# Patient Record
Sex: MALE | Race: WHITE | URBAN - METROPOLITAN AREA
[De-identification: names, ages, dates, MRNs, and addresses within clinical notes are randomized per-mention and may not be internally consistent; named-entity substitution may affect disease eponyms.]

---

## 2017-01-17 ENCOUNTER — HOSPITAL ENCOUNTER (EMERGENCY)
Facility: CLINIC | Age: 57
Discharge: HOME | End: 2017-01-17
Attending: EMERGENCY MEDICINE | Admitting: EMERGENCY MEDICINE
Payer: COMMERCIAL

## 2017-01-17 VITALS
SYSTOLIC BLOOD PRESSURE: 158 MMHG | DIASTOLIC BLOOD PRESSURE: 88 MMHG | RESPIRATION RATE: 16 BRPM | HEART RATE: 73 BPM | TEMPERATURE: 98.3 F | OXYGEN SATURATION: 95 %

## 2017-01-17 DIAGNOSIS — K57.32 DIVERTICULITIS OF COLON: ICD-10-CM

## 2017-01-17 PROCEDURE — 99284 EMERGENCY DEPT VISIT MOD MDM: CPT | Mod: Z6 | Performed by: EMERGENCY MEDICINE

## 2017-01-17 PROCEDURE — 25000128 H RX IP 250 OP 636: Performed by: EMERGENCY MEDICINE

## 2017-01-17 PROCEDURE — 25000132 ZZH RX MED GY IP 250 OP 250 PS 637: Performed by: EMERGENCY MEDICINE

## 2017-01-17 PROCEDURE — 99284 EMERGENCY DEPT VISIT MOD MDM: CPT | Mod: 25 | Performed by: EMERGENCY MEDICINE

## 2017-01-17 PROCEDURE — 96372 THER/PROPH/DIAG INJ SC/IM: CPT | Performed by: EMERGENCY MEDICINE

## 2017-01-17 RX ORDER — OXYCODONE HYDROCHLORIDE 5 MG/1
5 TABLET ORAL EVERY 6 HOURS PRN
Qty: 30 TABLET | Refills: 0 | Status: SHIPPED | OUTPATIENT
Start: 2017-01-17 | End: 2017-09-26

## 2017-01-17 RX ORDER — OXYCODONE HYDROCHLORIDE 5 MG/1
5 TABLET ORAL ONCE
Status: COMPLETED | OUTPATIENT
Start: 2017-01-17 | End: 2017-01-17

## 2017-01-17 RX ORDER — OXYCODONE HYDROCHLORIDE 5 MG/1
5 TABLET ORAL EVERY 6 HOURS PRN
Qty: 20 TABLET | Refills: 0 | Status: SHIPPED | OUTPATIENT
Start: 2017-01-17 | End: 2017-01-17

## 2017-01-17 RX ADMIN — OXYCODONE HYDROCHLORIDE 5 MG: 5 TABLET ORAL at 01:45

## 2017-01-17 RX ADMIN — HYDROMORPHONE HYDROCHLORIDE 1 MG: 1 INJECTION, SOLUTION INTRAMUSCULAR; INTRAVENOUS; SUBCUTANEOUS at 02:21

## 2017-01-17 RX ADMIN — AMOXICILLIN AND CLAVULANATE POTASSIUM 1 TABLET: 875; 125 TABLET, FILM COATED ORAL at 01:45

## 2017-01-17 NOTE — ED NOTES
Pt presents with LLQ  Pain that radiates to back x 3 days.   Strained his back about a month ago while doing yoga but thinks it's his diverticulitis.  +Constipation.  Last BM 3 days ago.  Denies n/v/d, fever, hematuria or dysuria.

## 2017-01-17 NOTE — DISCHARGE INSTRUCTIONS
Please make an appointment to follow up with Your Primary Care Provider if not improving.        Diverticulitis    Some people get pouches along the wall of the colon as they get older. The pouches, called diverticuli, usually cause no symptoms. If the pouches become blocked, you can get an infection. This infection is called diverticulitis. It causes pain in your lower abdomen and fever. If not treated, it can become a serious condition, causing an abscess to form inside the pouch. The abscess may block the intestinal tract even or rupture, spreading infection throughout the abdomen.  When treatment is started early, oral antibiotics alone may be enough to cure diverticulitis. This method is tried first. But, if you don't improve or if your condition gets worse while you are trying oral antibiotics, you may need to be admitted to the hospital for IV antibiotics. Severe cases may require surgery.  Home care  The following guidelines will help you care for yourself at home:    During the acute illness, rest and follow a low-fiber diet. Include foods like:    Flake cereal, mashed potatoes, pancakes, waffles, pasta, white bread, rice, applesauce, bananas, eggs, meat, fish, poultry, tofu, and cooked vegetables    Take antibiotics exactly as the doctor says. Don't miss any doses or stop taking the medication, even if you feel better.    Monitor your temperature and report any rising temperatures to your doctor.  Preventing future attacks  Once you have had an episode of diverticulitis, you are at risk for having it again. After you have recovered from this episode, you may be able to reduce your risk by eating a high-fiber diet (20-35 gm/day of fiber). This cleans out the colon pouches that already exist and prevents new ones from forming. Foods high in fiber include fresh fruits and edible peelings, raw or lightly cooked vegetables, whole grain cereals and breads, dried beans and peas, and bran.  Other steps that can  help prevent future attacks include:    Take your medications, such as antibiotics, as the doctor says.    Drink 6 to 8 glasses of water every day, unless directed otherwise.    Use a heating pad or hot water bottle to reduce abdominal cramping or pain.    Begin an exercise program. Ask your doctor how to get started. You can benefit from simple activities such as walking or gardening.    Treat diarrhea with a bland diet. Start with liquids only; then slowly add fiber over time.    Watch for changes in your bowel movements (constipation to diarrhea).    Get plenty of rest and sleep.  Follow-up care  Follow up with your doctor as advised or sooner if you are not getting better in the next 2 days.  When to seek medical care  Get prompt medical attention if any of the following occur:    Fever of 100.4 F (38 C) or higher, or as directed by your health care provider    Repeated vomiting or swelling of the abdomen    Weakness, dizziness, light-headedness    Pain in your abdomen that gets worse, severe, or spreads to your back    Pain that moves to the right lower abdomen    Rectal bleeding (stools that are red, black or maroon color)    Unexpected vaginal bleeding    4629-3449 [employer]. [address] [place] [address]. All rights reserved. This information is not intended as a substitute for professional medical care. Always follow your healthcare professional's instructions.

## 2017-01-17 NOTE — ED AVS SNAPSHOT
[facility], [place], Emergency Department    [address] [address]    Phone:  [phone]                                       Sarbjit Boone   MRN: [id number]    Department:  [facility], Emergency Department   Date of Visit:  1/17/2017           After Visit Summary Signature Page     I have received my discharge instructions, and my questions have been answered. I have discussed any challenges I see with this plan with the nurse or doctor.    ..........................................................................................................................................  Patient/Patient Representative Signature      ..........................................................................................................................................  Patient Representative Print Name and Relationship to Patient    ..................................................               ................................................  Date                                            Time    ..........................................................................................................................................  Reviewed by Signature/Title    ...................................................              ..............................................  Date                                                            Time

## 2017-01-17 NOTE — ED PROVIDER NOTES
History     Chief Complaint   Patient presents with     Abdominal Pain     HPI  Sarbjit Boone is a 56 year old male with a history of hypercholesterolemia, diverticulitis and chronic low back pain who presents with abdominal pain and constipation. The patient reports that he has had pain radiating from the right lower back around into his lower abdomen since Saturday, 3 days ago. The pain has been constant and progressively worsening since onset, especially tonight, prompting his arrival to the ED. He has been taking Tylenol for the pain without relief. The pain improves slightly with laying on his left side in a fetal position. He complains of constipation over the past 2 days. He denies nausea, vomiting, diarrhea or urinary symptoms. The patient reports a history of chronic back pain as a result of a yoga injury, and is unsure if this is contributing. He denies rash. He has no history of shingles. He denies any known medication allergies. He reports a history of diverticulitis in the past which was treated with antibiotics.     Past Medical History   Diagnosis Date     Allergic rhinitis, cause unspecified      Allergic rhinitis     LBP (low back pain)      Melanoma in situ of back (H) 2008     Diverticulosis, sigmoid      Pure hypercholesterolaemia      Impaired fasting glucose      Diverticulitis of sigmoid colon 12/08 ER       Past Surgical History   Procedure Laterality Date     Surgical history of -   10/2008     Excision Melanoma     Hc colonoscopy thru stoma w biopsy/cautery tumor/polyp/lesion  9/08     repeat 3 yr       Family History   Problem Relation Age of Onset     C.A.D. Mother      age72     C.A.D. Father      age 73       Social History   Substance Use Topics     Smoking status: Never Smoker      Smokeless tobacco: Never Used     Alcohol Use: Yes      Comment: wine         No current facility-administered medications for this encounter.     Current Outpatient Prescriptions   Medication      amoxicillin-clavulanate (AUGMENTIN) 875-125 MG per tablet     oxyCODONE (ROXICODONE) 5 MG IR tablet     UNABLE TO FIND     Zileuton (ZYFLO PO)     UNABLE TO FIND     magnesium 250 MG tablet     Coenzyme Q10 (CO Q 10) 10 MG CAPS     FISH OIL     Multiple Vitamins-Minerals (MULTIVITAL PO)     Cholecalciferol (VITAMIN D PO)     Probiotic Product (PROBIOTIC PO)     aspirin 81 MG tablet     simvastatin (ZOCOR) 20 MG tablet     sildenafil (VIAGRA) 50 MG tablet        Allergies   Allergen Reactions     Nkda [No Known Drug Allergies]       I have reviewed the Medications, Allergies, Past Medical and Surgical History, and Social History in the Epic system.    Review of Systems  10 pt ROS completed and negative except as noted above in HPI   Physical Exam   BP: 149/87 mmHg  Pulse: 73  Temp: 98.3  F (36.8  C)  Resp: 16  SpO2: 98 %  Physical Exam   Constitutional: He is oriented to person, place, and time. No distress.   HENT:   Head: Normocephalic and atraumatic.   Mouth/Throat: No oropharyngeal exudate.   Eyes: Conjunctivae are normal. Pupils are equal, round, and reactive to light. No scleral icterus.   Neck: Normal range of motion. Neck supple.   Cardiovascular: Normal rate and intact distal pulses.    Pulmonary/Chest: Effort normal. No respiratory distress. He has no wheezes. He has no rales. He exhibits no tenderness.   Abdominal: Soft. He exhibits no distension. There is tenderness. There is no rebound and no guarding.   Mild lef lower abdominal tenderness   Musculoskeletal: Normal range of motion.   Neurological: He is alert and oriented to person, place, and time.   Skin: Skin is warm and dry. He is not diaphoretic.   Nursing note and vitals reviewed.      ED Course   Procedures     1:04 AM  The patient was seen and examined by Dr. Altman in Room 17.               Critical Care time:  none               Labs Ordered and Resulted from Time of ED Arrival Up to the Time of Departure from the ED - No data to  display    Assessments & Plan (with Medical Decision Making)   1.  Diveriticulitis    57 yo M with a history of Diverticulitis who presents with left lower abdominal pain.  Patient is non-toxic.  His exam shows no evidence of an acute abdomen.  Given his prior history of diverticulitis and clinical presentation, he'll be treated with Diverticulitis with Augmentin.  He felt improved after Dilaudid 1mg IM.  The patient was discharged and told to return if any worsening symptoms.       I have reviewed the nursing notes.    I have reviewed the findings, diagnosis, plan and need for follow up with the patient.    New Prescriptions    AMOXICILLIN-CLAVULANATE (AUGMENTIN) 875-125 MG PER TABLET    Take 1 tablet by mouth 2 times daily for 7 days    OXYCODONE (ROXICODONE) 5 MG IR TABLET    Take 1 tablet (5 mg) by mouth every 6 hours as needed for pain       Final diagnoses:   Diverticulitis of colon     IJoao, am serving as a trained medical scribe to document services personally performed by Chris Altman MD, based on the provider's statements to me.   IChris MD, was physically present and have reviewed and verified the accuracy of this note documented by Joao Scott.     1/17/2017   [facility], [place], EMERGENCY DEPARTMENT      Chris Altman MD  01/17/17 0303

## 2017-01-17 NOTE — ED AVS SNAPSHOT
[facility], Emergency Department    [address] [facility] [address]    Phone:  [phone]                                       Sarbjit Boone   MRN: [id number]    Department:  [facility], Emergency Department   Date of Visit:  1/17/2017           Patient Information     Date Of Birth          1960        Your diagnoses for this visit were:     Diverticulitis of colon        You were seen by Chris Altman MD.        Discharge Instructions       Please make an appointment to follow up with Your Primary Care Provider if not improving.        Diverticulitis    Some people get pouches along the wall of the colon as they get older. The pouches, called diverticuli, usually cause no symptoms. If the pouches become blocked, you can get an infection. This infection is called diverticulitis. It causes pain in your lower abdomen and fever. If not treated, it can become a serious condition, causing an abscess to form inside the pouch. The abscess may block the intestinal tract even or rupture, spreading infection throughout the abdomen.  When treatment is started early, oral antibiotics alone may be enough to cure diverticulitis. This method is tried first. But, if you don't improve or if your condition gets worse while you are trying oral antibiotics, you may need to be admitted to the hospital for IV antibiotics. Severe cases may require surgery.  Home care  The following guidelines will help you care for yourself at home:    During the acute illness, rest and follow a low-fiber diet. Include foods like:    Flake cereal, mashed potatoes, pancakes, waffles, pasta, white bread, rice, applesauce, bananas, eggs, meat, fish, poultry, tofu, and cooked vegetables    Take antibiotics exactly as the doctor says. Don't miss any doses or stop taking the medication, even if you feel better.    Monitor your temperature and report any rising temperatures to your doctor.  Preventing future attacks  Once you  have had an episode of diverticulitis, you are at risk for having it again. After you have recovered from this episode, you may be able to reduce your risk by eating a high-fiber diet (20-35 gm/day of fiber). This cleans out the colon pouches that already exist and prevents new ones from forming. Foods high in fiber include fresh fruits and edible peelings, raw or lightly cooked vegetables, whole grain cereals and breads, dried beans and peas, and bran.  Other steps that can help prevent future attacks include:    Take your medications, such as antibiotics, as the doctor says.    Drink 6 to 8 glasses of water every day, unless directed otherwise.    Use a heating pad or hot water bottle to reduce abdominal cramping or pain.    Begin an exercise program. Ask your doctor how to get started. You can benefit from simple activities such as walking or gardening.    Treat diarrhea with a bland diet. Start with liquids only; then slowly add fiber over time.    Watch for changes in your bowel movements (constipation to diarrhea).    Get plenty of rest and sleep.  Follow-up care  Follow up with your doctor as advised or sooner if you are not getting better in the next 2 days.  When to seek medical care  Get prompt medical attention if any of the following occur:    Fever of 100.4 F (38 C) or higher, or as directed by your health care provider    Repeated vomiting or swelling of the abdomen    Weakness, dizziness, light-headedness    Pain in your abdomen that gets worse, severe, or spreads to your back    Pain that moves to the right lower abdomen    Rectal bleeding (stools that are red, black or maroon color)    Unexpected vaginal bleeding    9087-0505 [employer]. [address] [place] [address], [place] [address]. All rights reserved. This information is not intended as a substitute for professional medical care. Always follow your healthcare professional's instructions.          Future Appointments        Provider  Department Dept Phone Center    3/22/2050 8:00 AM [facility] ULTRASOUND ROOM 1 [facility], [place], Ultrasound [phone] [place]      24 Hour Appointment Hotline       To make an appointment at any [place] clinic, call [phone] ([phone]). If you don't have a family doctor or clinic, we will help you find one. [place] clinics are conveniently located to serve the needs of you and your family.             Review of your medicines      START taking        Dose / Directions Last dose taken    amoxicillin-clavulanate 875-125 MG per tablet   Commonly known as:  AUGMENTIN   Dose:  1 tablet   Quantity:  14 tablet        Take 1 tablet by mouth 2 times daily for 7 days   Refills:  0        oxyCODONE 5 MG IR tablet   Commonly known as:  ROXICODONE   Dose:  5 mg   Quantity:  20 tablet        Take 1 tablet (5 mg) by mouth every 6 hours as needed for pain   Refills:  0          Our records show that you are taking the medicines listed below. If these are incorrect, please call your family doctor or clinic.        Dose / Directions Last dose taken    aspirin 81 MG tablet   Dose:  1 tablet        Take 1 tablet by mouth daily.   Refills:  3        Co Q 10 10 MG Caps   Quantity:  30 capsule        Refills:  0        FISH OIL   Dose:  1 capsule        1 capsule. 500mg   Refills:  0        magnesium 250 MG tablet   Dose:  1 tablet   Quantity:  30 tablet        Take 1 tablet by mouth daily   Refills:  0        MULTIVITAL PO        Take  by mouth.   Refills:  0        PROBIOTIC PO        Take  by mouth.   Refills:  0        sildenafil 50 MG cap/tab   Commonly known as:  REVATIO/VIAGRA   Dose:  25-50 mg   Quantity:  12 tablet        Take 0.5-1 tablets (25-50 mg) by mouth daily as needed for erectile dysfunction Take 30 min to 4 hours before intercourse.  Never use with nitroglycerin, terazosin or doxazosin.   Refills:  11        simvastatin 20 MG tablet   Commonly known as:  ZOCOR   Dose:  20 mg   Quantity:  90  tablet        Take 1 tablet (20 mg) by mouth At Bedtime   Refills:  3        * UNABLE TO FIND   Dose:  1 tablet        Take 1 tablet by mouth daily MEDICATION NAME: Tumeric   Refills:  0        * UNABLE TO FIND   Dose:  2 tablet        Take 2 tablets by mouth daily MEDICATION NAME: Zyflomend   Refills:  0        VITAMIN D PO   Dose:  4000 Units        Take 4,000 Units by mouth daily   Refills:  0        ZYFLO PO        Refills:  0        * Notice:  This list has 2 medication(s) that are the same as other medications prescribed for you. Read the directions carefully, and ask your doctor or other care provider to review them with you.            Prescriptions were sent or printed at these locations (2 Prescriptions)                   Other Prescriptions                Printed at Department/Unit printer (2 of 2)         amoxicillin-clavulanate (AUGMENTIN) 875-125 MG per tablet               oxyCODONE (ROXICODONE) 5 MG IR tablet                Orders Needing Specimen Collection     None      Pending Results     No orders found from 1/16/2017 to 1/18/2017.            Pending Culture Results     No orders found from 1/16/2017 to 1/18/2017.            Thank you for choosing [facility]       Thank you for choosing [facility] for your care. Our goal is always to provide you with excellent care. Hearing back from our patients is one way we can continue to improve our services. Please take a few minutes to complete the written survey that you may receive in the mail after you visit with us. Thank you!        [facility] Information     [employer] gives you secure access to your electronic health record. If you see a primary care provider, you can also send messages to your care team and make appointments. If you have questions, please call your primary care clinic.  If you do not have a primary care provider, please call [phone] and they will assist you.        Care EveryWhere ID     This is your Care EveryWhere ID. This could be  used by other organizations to access your [facility] medical records  [id number]        After Visit Summary       This is your record. Keep this with you and show to your community pharmacist(s) and doctor(s) at your next visit.

## 2017-01-22 ENCOUNTER — OFFICE VISIT (OUTPATIENT)
Dept: URGENT CARE | Facility: URGENT CARE | Age: 57
End: 2017-01-22

## 2017-01-22 ENCOUNTER — HOSPITAL ENCOUNTER (EMERGENCY)
Facility: CLINIC | Age: 57
Discharge: HOME | End: 2017-01-22
Attending: EMERGENCY MEDICINE | Admitting: EMERGENCY MEDICINE
Payer: COMMERCIAL

## 2017-01-22 ENCOUNTER — APPOINTMENT (OUTPATIENT)
Dept: CT IMAGING | Facility: CLINIC | Age: 57
End: 2017-01-22
Attending: EMERGENCY MEDICINE
Payer: COMMERCIAL

## 2017-01-22 VITALS
DIASTOLIC BLOOD PRESSURE: 85 MMHG | TEMPERATURE: 98.9 F | OXYGEN SATURATION: 97 % | HEART RATE: 61 BPM | WEIGHT: 194 LBS | SYSTOLIC BLOOD PRESSURE: 146 MMHG | RESPIRATION RATE: 18 BRPM | HEIGHT: 72 IN | BODY MASS INDEX: 26.28 KG/M2

## 2017-01-22 VITALS — TEMPERATURE: 98.1 F | HEART RATE: 89 BPM | OXYGEN SATURATION: 98 %

## 2017-01-22 DIAGNOSIS — Z53.9 DIAGNOSIS NOT YET DEFINED: Primary | ICD-10-CM

## 2017-01-22 DIAGNOSIS — M54.5 ACUTE LEFT-SIDED LOW BACK PAIN, WITH SCIATICA PRESENCE UNSPECIFIED: ICD-10-CM

## 2017-01-22 LAB
ALBUMIN SERPL-MCNC: 3.8 G/DL (ref 3.4–5)
ALBUMIN UR-MCNC: NEGATIVE MG/DL
ALP SERPL-CCNC: 49 U/L (ref 40–150)
ALT SERPL W P-5'-P-CCNC: 28 U/L (ref 0–70)
AMORPH CRY #/AREA URNS HPF: ABNORMAL /HPF
ANION GAP SERPL CALCULATED.3IONS-SCNC: 6 MMOL/L (ref 3–14)
APPEARANCE UR: CLEAR
AST SERPL W P-5'-P-CCNC: 18 U/L (ref 0–45)
BASOPHILS # BLD AUTO: 0 10E9/L (ref 0–0.2)
BASOPHILS NFR BLD AUTO: 0.2 %
BILIRUB SERPL-MCNC: 0.7 MG/DL (ref 0.2–1.3)
BILIRUB UR QL STRIP: NEGATIVE
BUN SERPL-MCNC: 13 MG/DL (ref 7–30)
CALCIUM SERPL-MCNC: 9.7 MG/DL (ref 8.5–10.1)
CHLORIDE SERPL-SCNC: 109 MMOL/L (ref 94–109)
CO2 SERPL-SCNC: 28 MMOL/L (ref 20–32)
COLOR UR AUTO: ABNORMAL
CREAT SERPL-MCNC: 0.87 MG/DL (ref 0.66–1.25)
DIFFERENTIAL METHOD BLD: NORMAL
EOSINOPHIL # BLD AUTO: 0 10E9/L (ref 0–0.7)
EOSINOPHIL NFR BLD AUTO: 0.7 %
ERYTHROCYTE [DISTWIDTH] IN BLOOD BY AUTOMATED COUNT: 12.5 % (ref 10–15)
GFR SERPL CREATININE-BSD FRML MDRD: ABNORMAL ML/MIN/1.7M2
GLUCOSE SERPL-MCNC: 109 MG/DL (ref 70–99)
GLUCOSE UR STRIP-MCNC: NEGATIVE MG/DL
HCT VFR BLD AUTO: 43.1 % (ref 40–53)
HGB BLD-MCNC: 15.1 G/DL (ref 13.3–17.7)
HGB UR QL STRIP: NEGATIVE
IMM GRANULOCYTES # BLD: 0 10E9/L (ref 0–0.4)
IMM GRANULOCYTES NFR BLD: 0 %
KETONES UR STRIP-MCNC: NEGATIVE MG/DL
LEUKOCYTE ESTERASE UR QL STRIP: NEGATIVE
LYMPHOCYTES # BLD AUTO: 1.2 10E9/L (ref 0.8–5.3)
LYMPHOCYTES NFR BLD AUTO: 30.7 %
MCH RBC QN AUTO: 30.6 PG (ref 26.5–33)
MCHC RBC AUTO-ENTMCNC: 35 G/DL (ref 31.5–36.5)
MCV RBC AUTO: 87 FL (ref 78–100)
MONOCYTES # BLD AUTO: 0.5 10E9/L (ref 0–1.3)
MONOCYTES NFR BLD AUTO: 13 %
MUCOUS THREADS #/AREA URNS LPF: PRESENT /LPF
NEUTROPHILS # BLD AUTO: 2.2 10E9/L (ref 1.6–8.3)
NEUTROPHILS NFR BLD AUTO: 55.4 %
NITRATE UR QL: NEGATIVE
NRBC # BLD AUTO: 0 10*3/UL
NRBC BLD AUTO-RTO: 0 /100
PH UR STRIP: 6.5 PH (ref 5–7)
PLATELET # BLD AUTO: 215 10E9/L (ref 150–450)
POTASSIUM SERPL-SCNC: 3.8 MMOL/L (ref 3.4–5.3)
PROT SERPL-MCNC: 7.2 G/DL (ref 6.8–8.8)
RBC # BLD AUTO: 4.93 10E12/L (ref 4.4–5.9)
RBC #/AREA URNS AUTO: 1 /HPF (ref 0–2)
SODIUM SERPL-SCNC: 143 MMOL/L (ref 133–144)
SP GR UR STRIP: 1.01 (ref 1–1.03)
URN SPEC COLLECT METH UR: ABNORMAL
UROBILINOGEN UR STRIP-MCNC: NORMAL MG/DL (ref 0–2)
WBC # BLD AUTO: 4 10E9/L (ref 4–11)
WBC #/AREA URNS AUTO: 1 /HPF (ref 0–2)

## 2017-01-22 PROCEDURE — 74177 CT ABD & PELVIS W/CONTRAST: CPT

## 2017-01-22 PROCEDURE — 25000125 ZZHC RX 250: Performed by: EMERGENCY MEDICINE

## 2017-01-22 PROCEDURE — 96374 THER/PROPH/DIAG INJ IV PUSH: CPT

## 2017-01-22 PROCEDURE — 96361 HYDRATE IV INFUSION ADD-ON: CPT | Mod: 59

## 2017-01-22 PROCEDURE — 81001 URINALYSIS AUTO W/SCOPE: CPT | Performed by: EMERGENCY MEDICINE

## 2017-01-22 PROCEDURE — 96375 TX/PRO/DX INJ NEW DRUG ADDON: CPT

## 2017-01-22 PROCEDURE — 99285 EMERGENCY DEPT VISIT HI MDM: CPT | Mod: 25

## 2017-01-22 PROCEDURE — 99285 EMERGENCY DEPT VISIT HI MDM: CPT | Mod: Z6 | Performed by: EMERGENCY MEDICINE

## 2017-01-22 PROCEDURE — 85025 COMPLETE CBC W/AUTO DIFF WBC: CPT | Performed by: EMERGENCY MEDICINE

## 2017-01-22 PROCEDURE — 25000128 H RX IP 250 OP 636: Performed by: EMERGENCY MEDICINE

## 2017-01-22 PROCEDURE — 25800025 ZZH RX 258: Performed by: EMERGENCY MEDICINE

## 2017-01-22 PROCEDURE — 25000132 ZZH RX MED GY IP 250 OP 250 PS 637: Performed by: EMERGENCY MEDICINE

## 2017-01-22 PROCEDURE — 80053 COMPREHEN METABOLIC PANEL: CPT | Performed by: EMERGENCY MEDICINE

## 2017-01-22 PROCEDURE — 25500064 ZZH RX 255 OP 636: Performed by: EMERGENCY MEDICINE

## 2017-01-22 RX ORDER — ONDANSETRON 2 MG/ML
8 INJECTION INTRAMUSCULAR; INTRAVENOUS ONCE
Status: COMPLETED | OUTPATIENT
Start: 2017-01-22 | End: 2017-01-22

## 2017-01-22 RX ORDER — METHYLPREDNISOLONE 4 MG
TABLET, DOSE PACK ORAL
Qty: 21 TABLET | Refills: 0 | Status: SHIPPED | OUTPATIENT
Start: 2017-01-22 | End: 2017-09-26

## 2017-01-22 RX ORDER — LIDOCAINE 50 MG/G
1 PATCH TOPICAL ONCE
Status: COMPLETED | OUTPATIENT
Start: 2017-01-22 | End: 2017-01-22

## 2017-01-22 RX ORDER — KETOROLAC TROMETHAMINE 30 MG/ML
30 INJECTION, SOLUTION INTRAMUSCULAR; INTRAVENOUS ONCE
Status: COMPLETED | OUTPATIENT
Start: 2017-01-22 | End: 2017-01-22

## 2017-01-22 RX ORDER — IOPAMIDOL 755 MG/ML
100 INJECTION, SOLUTION INTRAVASCULAR ONCE
Status: COMPLETED | OUTPATIENT
Start: 2017-01-22 | End: 2017-01-22

## 2017-01-22 RX ORDER — GABAPENTIN 300 MG/1
300 CAPSULE ORAL 3 TIMES DAILY
Qty: 90 CAPSULE | Refills: 1 | Status: SHIPPED | OUTPATIENT
Start: 2017-01-22

## 2017-01-22 RX ORDER — LIDOCAINE 50 MG/G
PATCH TOPICAL
Qty: 10 PATCH | Refills: 0 | Status: SHIPPED | OUTPATIENT
Start: 2017-01-22 | End: 2017-09-26

## 2017-01-22 RX ADMIN — SODIUM CHLORIDE 55 ML: 9 INJECTION, SOLUTION INTRAVENOUS at 21:31

## 2017-01-22 RX ADMIN — ONDANSETRON 8 MG: 2 INJECTION INTRAMUSCULAR; INTRAVENOUS at 21:13

## 2017-01-22 RX ADMIN — HYDROMORPHONE HYDROCHLORIDE 1 MG: 1 INJECTION, SOLUTION INTRAMUSCULAR; INTRAVENOUS; SUBCUTANEOUS at 19:02

## 2017-01-22 RX ADMIN — KETOROLAC TROMETHAMINE 30 MG: 30 INJECTION, SOLUTION INTRAMUSCULAR at 21:10

## 2017-01-22 RX ADMIN — SODIUM CHLORIDE, POTASSIUM CHLORIDE, SODIUM LACTATE AND CALCIUM CHLORIDE 1000 ML: 600; 310; 30; 20 INJECTION, SOLUTION INTRAVENOUS at 18:46

## 2017-01-22 RX ADMIN — LIDOCAINE 1 PATCH: 50 PATCH CUTANEOUS at 22:23

## 2017-01-22 RX ADMIN — IOPAMIDOL 95 ML: 755 INJECTION, SOLUTION INTRAVENOUS at 21:30

## 2017-01-22 ASSESSMENT — ENCOUNTER SYMPTOMS
HEMATURIA: 0
DYSURIA: 0
CONSTIPATION: 1
NAUSEA: 1
VOMITING: 0
BACK PAIN: 1

## 2017-01-22 NOTE — NURSING NOTE
"Chief Complaint   Patient presents with     Urgent Care     Abdominal Pain     llq abominal pain, possible sciatic pain on left side. Seen 1 week ago for probable diverticulitis. Pt is having nausea  and continued pain.        Initial Pulse 89  Temp(Src) 98.1  F (36.7  C) (Oral)  SpO2 98% Estimated body mass index is 26.49 kg/(m^2) as calculated from the following:    Height as of 3/25/16: 5' 11.5\" (1.816 m).    Weight as of 3/25/16: 192 lb 9.6 oz (87.363 kg).  BP completed using cuff size: regular  "

## 2017-01-22 NOTE — ED AVS SNAPSHOT
[facility], [place], Emergency Department    [address]    [facility] [address]    Phone:  [phone]    Fax:  [phone]                                       Sarbjit Boone   MRN: [id number]    Department:  [facility], Emergency Department   Date of Visit:  1/22/2017           After Visit Summary Signature Page     I have received my discharge instructions, and my questions have been answered. I have discussed any challenges I see with this plan with the nurse or doctor.    ..........................................................................................................................................  Patient/Patient Representative Signature      ..........................................................................................................................................  Patient Representative Print Name and Relationship to Patient    ..................................................               ................................................  Date                                            Time    ..........................................................................................................................................  Reviewed by Signature/Title    ...................................................              ..............................................  Date                                                            Time

## 2017-01-22 NOTE — ED AVS SNAPSHOT
[facility], Emergency Department    [address] [place] [address]    Phone:  [phone]    Fax:  [phone]                                       Sarbjit Boone   MRN: [id number]    Department:  [facility], Emergency Department   Date of Visit:  1/22/2017           Patient Information     Date Of Birth          1960        Your diagnoses for this visit were:     Acute left-sided low back pain, with sciatica presence unspecified        You were seen by William Elliott MD.        Discharge Instructions       Please make an appointment to follow up with Your Primary Care Provider as soon as possible for reevaluation and referral for MRI.    Neurontin and Medrol Dosepak as directed.    Lidoderm patch as directed, fill this if the patch that we applied seemed to help.    Return to the emergency Department for any problems.      Future Appointments        Provider Department Dept Phone Center    3/22/2050 8:00 AM [facility] ULTRASOUND ROOM 1 [facility], Ultrasound [phone] [employer]      24 Hour Appointment Hotline       To make an appointment at any [place] clinic, call [phone] ([phone]). If you don't have a family doctor or clinic, we will help you find one. [place] clinics are conveniently located to serve the needs of you and your family.             Review of your medicines      START taking        Dose / Directions Last dose taken    gabapentin 300 MG capsule   Commonly known as:  NEURONTIN   Dose:  300 mg   Quantity:  90 capsule        Take 1 capsule (300 mg) by mouth 3 times daily Start by taking 1 pill the 1st day, then 1 pill twice a day on the 2nd day and then begin taking one pill 3 times a day   Refills:  1        lidocaine 5 % Patch   Commonly known as:  LIDODERM   Quantity:  10 patch        Apply patch to affected area and leave on for 12 hours.  Remove patch for 12 hours before applying next patch.   Refills:  0         methylPREDNISolone 4 MG tablet   Commonly known as:  MEDROL DOSEPAK   Quantity:  21 tablet        Follow package instructions   Refills:  0          Our records show that you are taking the medicines listed below. If these are incorrect, please call your family doctor or clinic.        Dose / Directions Last dose taken    amoxicillin-clavulanate 875-125 MG per tablet   Commonly known as:  AUGMENTIN   Dose:  1 tablet   Quantity:  14 tablet        Take 1 tablet by mouth 2 times daily for 7 days   Refills:  0        aspirin 81 MG tablet   Dose:  1 tablet        Take 1 tablet by mouth daily.   Refills:  3        Co Q 10 10 MG Caps   Quantity:  30 capsule        Refills:  0        FISH OIL   Dose:  1 capsule        1 capsule. 500mg   Refills:  0        magnesium 250 MG tablet   Dose:  1 tablet   Quantity:  30 tablet        Take 1 tablet by mouth daily   Refills:  0        MULTIVITAL PO        Take  by mouth.   Refills:  0        oxyCODONE 5 MG IR tablet   Commonly known as:  ROXICODONE   Dose:  5 mg   Quantity:  30 tablet        Take 1 tablet (5 mg) by mouth every 6 hours as needed for pain   Refills:  0        PROBIOTIC PO        Take  by mouth.   Refills:  0        sildenafil 50 MG cap/tab   Commonly known as:  REVATIO/VIAGRA   Dose:  25-50 mg   Quantity:  12 tablet        Take 0.5-1 tablets (25-50 mg) by mouth daily as needed for erectile dysfunction Take 30 min to 4 hours before intercourse.  Never use with nitroglycerin, terazosin or doxazosin.   Refills:  11        simvastatin 20 MG tablet   Commonly known as:  ZOCOR   Dose:  20 mg   Quantity:  90 tablet        Take 1 tablet (20 mg) by mouth At Bedtime   Refills:  3        * UNABLE TO FIND   Dose:  1 tablet        Take 1 tablet by mouth daily MEDICATION NAME: Tumeric   Refills:  0        * UNABLE TO FIND   Dose:  2 tablet        Take 2 tablets by mouth daily MEDICATION NAME: Zyflomend   Refills:  0        VITAMIN D PO   Dose:  4000 Units        Take 4,000 Units by  mouth daily   Refills:  0        ZYFLO PO        Refills:  0        * Notice:  This list has 2 medication(s) that are the same as other medications prescribed for you. Read the directions carefully, and ask your doctor or other care provider to review them with you.            Prescriptions were sent or printed at these locations (3 Prescriptions)                   Other Prescriptions                Printed at Department/Unit printer (3 of 3)         gabapentin (NEURONTIN) 300 MG capsule               lidocaine (LIDODERM) 5 % Patch               methylPREDNISolone (MEDROL DOSEPAK) 4 MG tablet                Procedures and tests performed during your visit     CBC with platelets differential    CT Abdomen Pelvis w Contrast    Comprehensive metabolic panel    UA with Microscopic reflex to Culture      Orders Needing Specimen Collection     None      Pending Results     No orders found from 1/21/2017 to 1/23/2017.            Pending Culture Results     No orders found from 1/21/2017 to 1/23/2017.            Thank you for choosing [facility]       Thank you for choosing [facility] for your care. Our goal is always to provide you with excellent care. Hearing back from our patients is one way we can continue to improve our services. Please take a few minutes to complete the written survey that you may receive in the mail after you visit with us. Thank you!        [employer] Information     [employer] gives you secure access to your electronic health record. If you see a primary care provider, you can also send messages to your care team and make appointments. If you have questions, please call your primary care clinic.  If you do not have a primary care provider, please call [phone] and they will assist you.        Care EveryWhere ID     This is your Care EveryWhere ID. This could be used by other organizations to access your [facility] medical records  [id number]        After Visit Summary       This is your record. Keep this  with you and show to your community pharmacist(s) and doctor(s) at your next visit.

## 2017-01-23 ENCOUNTER — TELEPHONE (OUTPATIENT)
Dept: FAMILY MEDICINE | Facility: CLINIC | Age: 57
End: 2017-01-23

## 2017-01-23 DIAGNOSIS — M54.50 ACUTE BILATERAL LOW BACK PAIN WITHOUT SCIATICA: Primary | ICD-10-CM

## 2017-01-23 NOTE — ED PROVIDER NOTES
History     Chief Complaint   Patient presents with     Abdominal Pain     Abdominal pain and diverticulitis. Was seen in [place] ED last week. Antibiotics and pain medicine, but no improvement. Left buttock, groin, lower back pain increasing and effecting sleep.      Nausea     Started this morning.      HPI  Sarbjit Boone is a 56 year old male who presents to the Emergency Department with left buttock pain and constipation. Patient states that 6 days ago he had abdominal pain and constipation that felt similar to a previous episode of diverticulitis. He reports that he was given Augmentin and oxycodone after this visit. Patient states that 3 days after this visit he was able to have a bowel movement. He reports that as of late he has had a lot of gas and difficulty passing stool. He also reports that today he has had nausea. Patient states that he has had pain in his left buttock that radiates into his back. it intermittently radiates into his left groin. He states that the pain has prevented him from sleeping throughout the week. He describes this pain as feeling like he was punched in the buttock. The patient has been treating this with a heat pad and 15 mg of oxycodone with no improvement in his symptoms.      He was seen in Urgent Care today and was referred to the ER here at [place]. The pain is constant and he states it is improved with 20 mg of oxycodone and heating pad. He states that while walking yesterday he didn't feel pain but after standing still for 2 minutes the pain returns.     No dysuria, testicular pain, or hematuria. No rashes. He denies any episodes of emesis and states that his nausea started this morning after taking oxycodone. Patient reports a recent back injury from yoga and states that he has had left sided back problems since then. He states that he has never had back pain that radiates into his legs.     His spouse states that for the past 1.5 years he has been having more  frequent low back problems.     I have reviewed the Medications, Allergies, Past Medical and Surgical History, and Social History in the [facility] system.    PAST MEDICAL HISTORY  Past Medical History   Diagnosis Date     Allergic rhinitis, cause unspecified      Allergic rhinitis     LBP (low back pain)      Melanoma in situ of back (H) 2008     Diverticulosis, sigmoid      Pure hypercholesterolaemia      Impaired fasting glucose      Diverticulitis of sigmoid colon 12/08 ER     PAST SURGICAL HISTORY  Past Surgical History   Procedure Laterality Date     Surgical history of -   10/2008     Excision Melanoma     Hc colonoscopy thru stoma w biopsy/cautery tumor/polyp/lesion  9/08     repeat 3 yr     FAMILY HISTORY  Family History   Problem Relation Age of Onset     C.A.D. Mother      age72     C.A.D. Father      age 73     SOCIAL HISTORY  Social History   Substance Use Topics     Smoking status: Never Smoker      Smokeless tobacco: Never Used     Alcohol Use: Yes      Comment: wine         MEDICATIONS  Current Facility-Administered Medications   Medication     [START ON 1/23/2017] lidocaine (LIDODERM) patch REMOVAL     lidocaine (LIDODERM) Patch in Place     Current Outpatient Prescriptions   Medication     gabapentin (NEURONTIN) 300 MG capsule     lidocaine (LIDODERM) 5 % Patch     methylPREDNISolone (MEDROL DOSEPAK) 4 MG tablet     amoxicillin-clavulanate (AUGMENTIN) 875-125 MG per tablet     oxyCODONE (ROXICODONE) 5 MG IR tablet     UNABLE TO FIND     magnesium 250 MG tablet     Cholecalciferol (VITAMIN D PO)     Probiotic Product (PROBIOTIC PO)     simvastatin (ZOCOR) 20 MG tablet     Zileuton (ZYFLO PO)     UNABLE TO FIND     Coenzyme Q10 (CO Q 10) 10 MG CAPS     sildenafil (VIAGRA) 50 MG tablet     FISH OIL     Multiple Vitamins-Minerals (MULTIVITAL PO)     aspirin 81 MG tablet     ALLERGIES  Allergies   Allergen Reactions     Nkda [No Known Drug Allergies]       Review of Systems   Gastrointestinal: Positive for  nausea and constipation. Negative for vomiting.   Genitourinary: Negative for dysuria, hematuria and testicular pain.   Musculoskeletal: Positive for back pain.   Skin: Negative for rash.   All other systems reviewed and are negative.      Physical Exam   BP: (!) 169/92 mmHg  Heart Rate: 78  Temp: 98  F (36.7  C)  Resp: 16  Height: 182.9 cm (6')  Weight: 87.998 kg (194 lb)  SpO2: 97 %  Physical Exam   Constitutional: He is oriented to person, place, and time. Vital signs are normal. He appears well-developed and well-nourished.  Non-toxic appearance. He does not appear ill. No distress.   HENT:   Head: Normocephalic and atraumatic.   Mouth/Throat: Oropharynx is clear and moist. No oropharyngeal exudate.   Eyes: Conjunctivae and EOM are normal. Pupils are equal, round, and reactive to light. No scleral icterus.   Neck: Normal range of motion. Neck supple. No JVD present. No tracheal deviation present. No thyromegaly present.   Cardiovascular: Normal rate, regular rhythm, normal heart sounds and intact distal pulses.  Exam reveals no gallop and no friction rub.    No murmur heard.  Pulmonary/Chest: Effort normal and breath sounds normal. No respiratory distress.   Abdominal: Soft. Bowel sounds are normal. He exhibits no distension and no mass. There is no tenderness. There is no rigidity, no rebound, no guarding and no CVA tenderness. No hernia. Hernia confirmed negative in the right inguinal area and confirmed negative in the left inguinal area.   Genitourinary: Right testis shows no swelling. Left testis shows no swelling.   Musculoskeletal: Normal range of motion. He exhibits no edema or tenderness.   Lymphadenopathy:     He has no cervical adenopathy.        Right: No inguinal adenopathy present.        Left: No inguinal adenopathy present.   Neurological: He is alert and oriented to person, place, and time. He has normal strength. No cranial nerve deficit or sensory deficit.   Skin: Skin is warm and dry. No rash  noted. No erythema. No pallor.   Psychiatric: He has a normal mood and affect. His behavior is normal.   Nursing note and vitals reviewed.      ED Course     Procedures        Results for orders placed or performed during the hospital encounter of 01/22/17   CT Abdomen Pelvis w Contrast    Narrative    CT ABDOMEN PELVIS WITH CONTRAST   1/22/2017 9:34 PM     HISTORY: Left lower quadrant abdominal pain, history of  diverticulitis, evaluate for perforation, abscess or other process.    TECHNIQUE:  CT abdomen and pelvis with Isovue 370, 95 mL IV. Radiation  dose for this scan was reduced using automated exposure control,  adjustment of the mA and/or kV according to patient size, or iterative  reconstruction technique.    COMPARISON: CT abdomen and pelvis 1/5/2009.    FINDINGS: A few scattered colonic diverticula noted distally. However,  no acute diverticulitis is identified. Normal appendix. No bowel  obstruction or inflammation noted. The liver, gallbladder, adrenals,  spleen, pancreas, and kidneys show no acute findings. Vascular  calcifications. No fluid collections or free air.      Impression    IMPRESSION: No acute abnormality is identified.     WILFRID TELLEZ MD   CBC with platelets differential   Result Value Ref Range    WBC 4.0 4.0 - 11.0 10e9/L    RBC Count 4.93 4.4 - 5.9 10e12/L    Hemoglobin 15.1 13.3 - 17.7 g/dL    Hematocrit 43.1 40.0 - 53.0 %    MCV 87 78 - 100 fl    MCH 30.6 26.5 - 33.0 pg    MCHC 35.0 31.5 - 36.5 g/dL    RDW 12.5 10.0 - 15.0 %    Platelet Count 215 150 - 450 10e9/L    Diff Method Automated Method     % Neutrophils 55.4 %    % Lymphocytes 30.7 %    % Monocytes 13.0 %    % Eosinophils 0.7 %    % Basophils 0.2 %    % Immature Granulocytes 0.0 %    Nucleated RBCs 0 0 /100    Absolute Neutrophil 2.2 1.6 - 8.3 10e9/L    Absolute Lymphocytes 1.2 0.8 - 5.3 10e9/L    Absolute Monocytes 0.5 0.0 - 1.3 10e9/L    Absolute Eosinophils 0.0 0.0 - 0.7 10e9/L    Absolute Basophils 0.0 0.0 - 0.2 10e9/L     Abs Immature Granulocytes 0.0 0 - 0.4 10e9/L    Absolute Nucleated RBC 0.0    Comprehensive metabolic panel   Result Value Ref Range    Sodium 143 133 - 144 mmol/L    Potassium 3.8 3.4 - 5.3 mmol/L    Chloride 109 94 - 109 mmol/L    Carbon Dioxide 28 20 - 32 mmol/L    Anion Gap 6 3 - 14 mmol/L    Glucose 109 (H) 70 - 99 mg/dL    Urea Nitrogen 13 7 - 30 mg/dL    Creatinine 0.87 0.66 - 1.25 mg/dL    GFR Estimate >90  Non  GFR Calc   >60 mL/min/1.7m2    GFR Estimate If Black >90   GFR Calc   >60 mL/min/1.7m2    Calcium 9.7 8.5 - 10.1 mg/dL    Bilirubin Total 0.7 0.2 - 1.3 mg/dL    Albumin 3.8 3.4 - 5.0 g/dL    Protein Total 7.2 6.8 - 8.8 g/dL    Alkaline Phosphatase 49 40 - 150 U/L    ALT 28 0 - 70 U/L    AST 18 0 - 45 U/L   UA with Microscopic reflex to Culture   Result Value Ref Range    Color Urine Light Yellow     Appearance Urine Clear     Glucose Urine Negative NEG mg/dL    Bilirubin Urine Negative NEG    Ketones Urine Negative NEG mg/dL    Specific Gravity Urine 1.011 1.003 - 1.035    Blood Urine Negative NEG    pH Urine 6.5 5.0 - 7.0 pH    Protein Albumin Urine Negative NEG mg/dL    Urobilinogen mg/dL Normal 0.0 - 2.0 mg/dL    Nitrite Urine Negative NEG    Leukocyte Esterase Urine Negative NEG    Source Clean catch urine     WBC Urine 1 0 - 2 /HPF    RBC Urine 1 0 - 2 /HPF    Mucous Urine Present (A) NEG /LPF    Amorphous Crystals Few (A) NEG /HPF         Assessments & Plan (with Medical Decision Making)   This patient presented to the emergency department complaining of left-sided back pain that at times would radiate around to the groin and abdomen.  He recently had been treated for suspected diverticulitis.  On exam he has no abdominal tenderness or palpable pulsatile mass.  He is neurovascularly intact in his lower extremities and has no rash to suggest a zoster.  He has no scrotal pain or swelling.  Urine is not suggestive of urolithiasis or urinary tract infection.  He is  afebrile and has no leukocytosis and a CT scan that demonstrated no evidence of abscess, diverticulitis, perforation, or other acute intra-abdominal or retroperitoneal process.  At this point in time my suspicion is that this is neuropathic pain perhaps from spinal root impingement and is more of a manifestation of spinal issues. Nothing to suggest cauda equina, this is why I feel that further workup can occur as an outpatient and have recommended MRI.  He was treated in the emergency department with Toradol, Zofran, and Dilaudid with minimal response to his pain.  Lidoderm patch was applied and patient will be discharged with a Medrol Dosepak and Neurontin and instructions to follow-up with his clinic.  He was discharged with his wife in good condition.      I have reviewed the nursing notes.    I have reviewed the findings, diagnosis, plan and need for follow up with the patient.    Discharge Medication List as of 1/22/2017 10:57 PM      START taking these medications    Details   gabapentin (NEURONTIN) 300 MG capsule Take 1 capsule (300 mg) by mouth 3 times daily Start by taking 1 pill the 1st day, then 1 pill twice a day on the 2nd day and then begin taking one pill 3 times a day, Disp-90 capsule, R-1, Local Print      lidocaine (LIDODERM) 5 % Patch Apply patch to affected area and leave on for 12 hours.  Remove patch for 12 hours before applying next patch.Disp-10 patch, R-0Local Print      methylPREDNISolone (MEDROL DOSEPAK) 4 MG tablet Follow package instructions, Disp-21 tablet, R-0, Local Print             Final diagnoses:   Acute left-sided low back pain, with sciatica presence unspecified     I, Gustabo Ceja, am serving as a trained medical scribe to document services personally performed by William Elliott MD, based on the provider's statements to me.      IWilliam MD, was physically present and have reviewed and verified the accuracy of this note documented by Gustabo Ceja.     1/22/2017   [facility], [place], EMERGENCY DEPARTMENT      William Elliott MD  01/23/17 0005

## 2017-01-23 NOTE — TELEPHONE ENCOUNTER
Routing to provider - Tani - please review and advise as appropriate  1. Order request:  MRI  2. [place] ED visit 1/22/2016: advised further workup as outpatient and recommended MRI  3. Do you want F/U office visit for this order?  4. Last office visit 3/25/2016    Thank you,  Cesar Mejias RN

## 2017-01-23 NOTE — ED NOTES
Abdominal pain and diverticulitis. Was seen in [place] ED last week. Antibiotics and pain medicine, but no improvement. Left buttock, groin, lower back pain increasing and effecting sleep. Nausea started this morning.

## 2017-01-23 NOTE — TELEPHONE ENCOUNTER
The patient is anxious for the MRI order and would like to go to the [place] location.  Please follow up with the patient as soon as possible.

## 2017-01-23 NOTE — DISCHARGE INSTRUCTIONS
Please make an appointment to follow up with Your Primary Care Provider as soon as possible for reevaluation and referral for MRI.    Neurontin and Medrol Dosepak as directed.    Lidoderm patch as directed, fill this if the patch that we applied seemed to help.    Return to the emergency Department for any problems.

## 2017-01-23 NOTE — TELEPHONE ENCOUNTER
Reason for Call:  Other Order    Detailed comments: Pt would like an order for an MRI. He was seen at urgent care last night for this and was told to go to the ER the same day. Staff members at the [facility] ER suggested that the pt needs an order from primary care. Please contact pt regarding this. THanks!    Phone Number Patient can be reached at: Home number on file [phone] (home)    Best Time: Anytime    Can we leave a detailed message on this number? YES    Call taken on 1/23/2017 at 7:40 AM by Amy Stanford        Call taken on 1/23/2017 at 7:40 AM by Amy Stanford

## 2017-07-11 ENCOUNTER — OFFICE VISIT (OUTPATIENT)
Dept: FAMILY MEDICINE | Facility: CLINIC | Age: 57
End: 2017-07-11
Payer: COMMERCIAL

## 2017-07-11 VITALS
RESPIRATION RATE: 16 BRPM | HEIGHT: 72 IN | TEMPERATURE: 95.9 F | BODY MASS INDEX: 24.92 KG/M2 | DIASTOLIC BLOOD PRESSURE: 88 MMHG | WEIGHT: 184 LBS | HEART RATE: 91 BPM | SYSTOLIC BLOOD PRESSURE: 133 MMHG | OXYGEN SATURATION: 97 %

## 2017-07-11 DIAGNOSIS — E78.5 HYPERLIPIDEMIA LDL GOAL <130: ICD-10-CM

## 2017-07-11 PROCEDURE — 36415 COLL VENOUS BLD VENIPUNCTURE: CPT | Performed by: NURSE PRACTITIONER

## 2017-07-11 PROCEDURE — 82947 ASSAY GLUCOSE BLOOD QUANT: CPT | Performed by: NURSE PRACTITIONER

## 2017-07-11 PROCEDURE — 99213 OFFICE O/P EST LOW 20 MIN: CPT | Performed by: NURSE PRACTITIONER

## 2017-07-11 PROCEDURE — 80061 LIPID PANEL: CPT | Performed by: NURSE PRACTITIONER

## 2017-07-11 RX ORDER — SIMVASTATIN 20 MG
20 TABLET ORAL AT BEDTIME
Qty: 90 TABLET | Refills: 3 | Status: SHIPPED | OUTPATIENT
Start: 2017-07-11

## 2017-07-11 NOTE — PROGRESS NOTES
SUBJECTIVE:                                                    Sarbjit Boone is a 57 year old male who presents to clinic today for the following health issues:    Hyperlipidemia Follow-Up      Rate your low fat/cholesterol diet?: good    Taking statin?  Yes, no muscle aches from statin    Other lipid medications/supplements?:  none      Amount of exercise or physical activity: 6-7 days/week for an average of greater than 60 minutes    Problems taking medications regularly: No    Medication side effects: none    Diet: regular (no restrictions)    Feeling well.  Eating clean.  Exercising 2+ hours a day - walks, yoga, biking etc.    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      Problem list and histories reviewed & adjusted, as indicated.  Additional history: as documented      Reviewed and updated as needed this visit by clinical staff  Tobacco  Allergies  Meds  Problems  Med Hx  Surg Hx  Fam Hx  Soc Hx        Reviewed and updated as needed this visit by Provider  Tobacco  Allergies  Meds  Problems  Med Hx  Surg Hx  Fam Hx  Soc Hx          ROS:  Constitutional, HEENT, cardiovascular, pulmonary, gi and gu systems are negative, except as otherwise noted.    OBJECTIVE:     /88  Pulse 91  Temp 95.9  F (35.5  C) (Tympanic)  Resp 16  Ht 6' (1.829 m)  Wt 184 lb (83.5 kg)  SpO2 97%  BMI 24.95 kg/m2  Body mass index is 24.95 kg/(m^2).  GENERAL: healthy, alert and no distress  RESP: lungs clear to auscultation - no rales, rhonchi or wheezes  CV: regular rate and rhythm, normal S1 S2, no S3 or S4, no murmur, click or rub, no peripheral edema and peripheral pulses strong  MS: no gross musculoskeletal defects noted, no edema  SKIN: no suspicious lesions or rashes      ASSESSMENT/PLAN:         ICD-10-CM    1. Hyperlipidemia LDL goal <130 E78.5 simvastatin (ZOCOR) 20 MG tablet     Lipid Profile     Glucose     Labs pending.  Continue good diet and daily exercise.  F/u in 1 year.    See  Patient Instructions    YEFRI Blanca CNP  [facility]

## 2017-07-11 NOTE — NURSING NOTE
Chief Complaint   Patient presents with     Lipids     Recheck Medication     Simvastatin       Initial /88  Pulse 91  Temp 95.9  F (35.5  C) (Tympanic)  Resp 16  Ht 6' (1.829 m)  Wt 184 lb (83.5 kg)  SpO2 97%  BMI 24.95 kg/m2 Estimated body mass index is 24.95 kg/(m^2) as calculated from the following:    Height as of this encounter: 6' (1.829 m).    Weight as of this encounter: 184 lb (83.5 kg).  Medication Reconciliation: complete     Cecily Hernandez MA

## 2017-07-11 NOTE — MR AVS SNAPSHOT
After Visit Summary   7/11/2017    Sarbjit Boone    MRN: [id number]           Patient Information     Date Of Birth          1960        Visit Information        Provider Department      7/11/2017 9:40 AM Henna Freire APRN CNP [facility]        Today's Diagnoses     Hyperlipidemia LDL goal <130           Follow-ups after your visit        Who to contact     If you have questions or need follow up information about today's clinic visit or your schedule please contact [facility] directly at [phone].  Normal or non-critical lab and imaging results will be communicated to you by [employer], letter or phone within 4 business days after the clinic has received the results. If you do not hear from us within 7 days, please contact the clinic through [employer] or phone. If you have a critical or abnormal lab result, we will notify you by phone as soon as possible.  Submit refill requests through [employer] or call your pharmacy and they will forward the refill request to us. Please allow 3 business days for your refill to be completed.          Additional Information About Your Visit        MyChart Information     [employer] gives you secure access to your electronic health record. If you see a primary care provider, you can also send messages to your care team and make appointments. If you have questions, please call your primary care clinic.  If you do not have a primary care provider, please call [phone] and they will assist you.        Care EveryWhere ID     This is your Care EveryWhere ID. This could be used by other organizations to access your [facility] medical records  [id number]        Your Vitals Were     Pulse Temperature Respirations Height Pulse Oximetry BMI (Body Mass Index)    91 95.9  F (35.5  C) (Tympanic) 16 6' (1.829 m) 97% 24.95 kg/m2       Blood Pressure from Last 3 Encounters:   07/11/17 133/88   01/22/17 146/85   01/17/17 158/88     Weight from Last 3 Encounters:   07/11/17 184 lb (83.5 kg)   01/22/17 194 lb (88 kg)   03/25/16 192 lb 9.6 oz (87.4 kg)              We Performed the Following     Glucose     Lipid Profile          Today's Medication Changes          These changes are accurate as of: 7/11/17 10:12 AM.  If you have any questions, ask your nurse or doctor.               These medicines have changed or have updated prescriptions.        Dose/Directions    simvastatin 20 MG tablet   Commonly known as:  ZOCOR   This may have changed:  See the new instructions.   Used for:  Hyperlipidemia LDL goal <130   Changed by:  Henna Freire APRN CNP        Dose:  20 mg   Take 1 tablet (20 mg) by mouth At Bedtime   Quantity:  90 tablet   Refills:  3            Where to get your medicines      These medications were sent to [id number] - [place], [place] - [address] AT [facility]  [address], [place] [address]     Phone:  [phone]     simvastatin 20 MG tablet                Primary Care Provider Office Phone # Fax #    YEFRI Blanca [phone]       [facility] [address] [place] [id number]        Equal Access to Services     MON[place] ROCHE AH: Hadii leilani ku hadasho Soomaali, waaxda luqadaha, qaybta kaalmada adeegyada, waxay aby borges. So [facility] [phone].    ATENCIÓN: Si habla español, tiene a madison disposición servicios gratuitos de asistencia lingüística. Llame al [phone].    We comply with applicable federal civil rights laws and [place] laws. We do not discriminate on the basis of race, color, national origin, age, disability sex, sexual orientation or gender identity.            Thank you!     Thank you for choosing [facility]  for your care. Our goal is always to provide you with excellent care. Hearing back from our patients is one way we can continue to improve our services. Please take a few  minutes to complete the written survey that you may receive in the mail after your visit with us. Thank you!             Your Updated Medication List - Protect others around you: Learn how to safely use, store and throw away your medicines at www.disposemymeds.org.          This list is accurate as of: 7/11/17 10:12 AM.  Always use your most recent med list.                   Brand Name Dispense Instructions for use Diagnosis    aspirin 81 MG tablet      Take 1 tablet by mouth daily.    Preventative health care       Co Q 10 10 MG Caps     30 capsule         FISH OIL      1 capsule. 500mg    Hyperlipidemia LDL goal <130       gabapentin 300 MG capsule    NEURONTIN    90 capsule    Take 1 capsule (300 mg) by mouth 3 times daily Start by taking 1 pill the 1st day, then 1 pill twice a day on the 2nd day and then begin taking one pill 3 times a day        lidocaine 5 % Patch    LIDODERM    10 patch    Apply patch to affected area and leave on for 12 hours.  Remove patch for 12 hours before applying next patch.        magnesium 250 MG tablet     30 tablet    Take 1 tablet by mouth daily        methylPREDNISolone 4 MG tablet    MEDROL DOSEPAK    21 tablet    Follow package instructions        MULTIVITAL PO      Take  by mouth.        oxyCODONE 5 MG IR tablet    ROXICODONE    30 tablet    Take 1 tablet (5 mg) by mouth every 6 hours as needed for pain        PROBIOTIC PO      Take  by mouth.        sildenafil 50 MG cap/tab    REVATIO/VIAGRA    12 tablet    Take 0.5-1 tablets (25-50 mg) by mouth daily as needed for erectile dysfunction Take 30 min to 4 hours before intercourse.  Never use with nitroglycerin, terazosin or doxazosin.    ED (erectile dysfunction)       simvastatin 20 MG tablet    ZOCOR    90 tablet    Take 1 tablet (20 mg) by mouth At Bedtime    Hyperlipidemia LDL goal <130       * UNABLE TO FIND      Take 1 tablet by mouth daily MEDICATION NAME: Tumeric    Routine general medical examination at a [facility]  facility       * UNABLE TO FIND      Take 2 tablets by mouth daily MEDICATION NAME: Zyflomend    Routine general medical examination at a health care facility       VITAMIN D PO      Take 4,000 Units by mouth daily        ZYFLO PO       Routine general medical examination at a health care facility       * Notice:  This list has 2 medication(s) that are the same as other medications prescribed for you. Read the directions carefully, and ask your doctor or other care provider to review them with you.

## 2017-07-12 ENCOUNTER — TELEPHONE (OUTPATIENT)
Dept: FAMILY MEDICINE | Facility: CLINIC | Age: 57
End: 2017-07-12

## 2017-07-12 NOTE — TELEPHONE ENCOUNTER
Reason for Call:  Other results    Detailed comments: Patient is requesting his lab results be sent to him via [employer] as soon as they become final. Thanks!    Phone Number Patient can be reached at: Cell number on file:    Telephone Information:   Mobile [phone]       Best Time: Any    Can we leave a detailed message on this number? Not Applicable - requesting [employer] response    Call taken on 7/12/2017 at 11:33 AM by Evangelina Singleton

## 2017-07-13 LAB
CHOLEST SERPL-MCNC: 216 MG/DL
GLUCOSE SERPL-MCNC: 110 MG/DL (ref 70–99)
HDLC SERPL-MCNC: 86 MG/DL
LDLC SERPL CALC-MCNC: 112 MG/DL
NONHDLC SERPL-MCNC: 130 MG/DL
TRIGL SERPL-MCNC: 88 MG/DL

## 2017-09-26 ENCOUNTER — TELEPHONE (OUTPATIENT)
Dept: GASTROENTEROLOGY | Facility: OUTPATIENT CENTER | Age: 57
End: 2017-09-26

## 2017-09-26 DIAGNOSIS — Z12.11 ENCOUNTER FOR SCREENING COLONOSCOPY: Primary | ICD-10-CM

## 2017-09-26 NOTE — TELEPHONE ENCOUNTER
Patient taking any blood thinners ? 81 mg aspirin    Heart disease ? denies    Lung disease ?denies      Sleep apnea ? denies    Diabetic ? denies    Kidney disease ? denies    Dialysis ? n/a    Electronic implanted medical devices ? denies    Are you taking any narcotic pain medication ? no  What is your daily dosage ?    PTSD ? n/a    Prep instructions reviewed with patient ? Patient declined review, has had exam before.  policy, MAC sedation plan reviewed. Advised patient to have someone stay with him post exam    Pharmacy : [facility]    Indication for procedure : Screening colonoscopy    Referring provider : Self

## 2017-10-03 ENCOUNTER — TRANSFERRED RECORDS (OUTPATIENT)
Dept: HEALTH INFORMATION MANAGEMENT | Facility: CLINIC | Age: 57
End: 2017-10-03

## 2018-10-29 ENCOUNTER — TELEPHONE (OUTPATIENT)
Dept: FAMILY MEDICINE | Facility: CLINIC | Age: 58
End: 2018-10-29

## 2018-10-29 NOTE — TELEPHONE ENCOUNTER
Prior Authorization Retail Medication Request    Medication/Dose: simvastatin (ZOCOR) 20 MG tablet   ICD code (if different than what is on RX):  Previously Tried and Failed:  Rationale:    Insurance Name:    Insurance ID:      Pharmacy Information (if different than what is on RX)  Name:  Phone:    Please include previous medications tried and failed.  Please ask insurance for medications on formulary.

## 2018-10-30 NOTE — TELEPHONE ENCOUNTER
Central Prior Authorization Team   Phone: [phone]      PA NOT NEEDED    Medication: simvastatin-PA NOT NEEDED  Insurance Company:    Pharmacy Filling the Rx: [id number] - [place], [place] - [address] [facility] [address]  Filling Pharmacy Phone: [phone]  Filling Pharmacy Fax:    Start Date: 10/23/2018    Called pharmacy to see if PA is needed.  They have not filled for the patient in over a year and was told to disregard request.

## 2019-10-01 ENCOUNTER — HEALTH MAINTENANCE LETTER (OUTPATIENT)
Age: 59
End: 2019-10-01

## 2021-01-15 ENCOUNTER — HEALTH MAINTENANCE LETTER (OUTPATIENT)
Age: 61
End: 2021-01-15

## 2021-09-04 ENCOUNTER — HEALTH MAINTENANCE LETTER (OUTPATIENT)
Age: 61
End: 2021-09-04

## 2022-02-19 ENCOUNTER — HEALTH MAINTENANCE LETTER (OUTPATIENT)
Age: 62
End: 2022-02-19

## 2022-10-22 ENCOUNTER — HEALTH MAINTENANCE LETTER (OUTPATIENT)
Age: 62
End: 2022-10-22

## 2022-11-01 ENCOUNTER — APPOINTMENT (OUTPATIENT)
Dept: URBAN - METROPOLITAN AREA CLINIC 256 | Age: 62
Setting detail: DERMATOLOGY
End: 2022-11-01

## 2022-11-01 VITALS — WEIGHT: 195 LBS | HEIGHT: 72 IN

## 2022-11-01 DIAGNOSIS — L81.4 OTHER MELANIN HYPERPIGMENTATION: ICD-10-CM

## 2022-11-01 DIAGNOSIS — L82.1 OTHER SEBORRHEIC KERATOSIS: ICD-10-CM

## 2022-11-01 DIAGNOSIS — L57.0 ACTINIC KERATOSIS: ICD-10-CM

## 2022-11-01 DIAGNOSIS — D22 MELANOCYTIC NEVI: ICD-10-CM

## 2022-11-01 DIAGNOSIS — D18.0 HEMANGIOMA: ICD-10-CM

## 2022-11-01 DIAGNOSIS — Z85.820 PERSONAL HISTORY OF MALIGNANT MELANOMA OF SKIN: ICD-10-CM

## 2022-11-01 DIAGNOSIS — Z71.89 OTHER SPECIFIED COUNSELING: ICD-10-CM

## 2022-11-01 PROBLEM — D18.01 HEMANGIOMA OF SKIN AND SUBCUTANEOUS TISSUE: Status: ACTIVE | Noted: 2022-11-01

## 2022-11-01 PROBLEM — D22.5 MELANOCYTIC NEVI OF TRUNK: Status: ACTIVE | Noted: 2022-11-01

## 2022-11-01 PROCEDURE — OTHER LIQUID NITROGEN: OTHER

## 2022-11-01 PROCEDURE — OTHER COUNSELING: OTHER

## 2022-11-01 PROCEDURE — 99213 OFFICE O/P EST LOW 20 MIN: CPT | Mod: 25

## 2022-11-01 PROCEDURE — 17000 DESTRUCT PREMALG LESION: CPT

## 2022-11-01 PROCEDURE — OTHER MIPS QUALITY: OTHER

## 2022-11-01 ASSESSMENT — LOCATION SIMPLE DESCRIPTION DERM
LOCATION SIMPLE: RIGHT EAR
LOCATION SIMPLE: POSTERIOR NECK
LOCATION SIMPLE: RIGHT UPPER BACK

## 2022-11-01 ASSESSMENT — LOCATION ZONE DERM
LOCATION ZONE: EAR
LOCATION ZONE: NECK
LOCATION ZONE: TRUNK

## 2022-11-01 ASSESSMENT — LOCATION DETAILED DESCRIPTION DERM
LOCATION DETAILED: RIGHT INFERIOR UPPER BACK
LOCATION DETAILED: LEFT POSTERIOR NECK
LOCATION DETAILED: RIGHT SUPERIOR CRUS OF ANTIHELIX
LOCATION DETAILED: RIGHT MID-UPPER BACK

## 2022-11-01 NOTE — PROCEDURE: LIQUID NITROGEN
Render Post-Care Instructions In Note?: no
Post-Care Instructions: I reviewed with the patient in detail post-care instructions. Patient is to wear sunprotection, and avoid picking at any of the treated lesions. Pt may apply Vaseline to crusted or scabbing areas.
Show Aperture Variable?: Yes
Number Of Freeze-Thaw Cycles: 1 freeze-thaw cycle
Consent: The patient's consent was obtained including but not limited to risks of crusting, scabbing, blistering, scarring, darker or lighter pigmentary change, recurrence, incomplete removal and infection.
Duration Of Freeze Thaw-Cycle (Seconds): 4
Detail Level: Detailed

## 2023-04-01 ENCOUNTER — HEALTH MAINTENANCE LETTER (OUTPATIENT)
Age: 63
End: 2023-04-01

## 2023-06-01 ENCOUNTER — HEALTH MAINTENANCE LETTER (OUTPATIENT)
Age: 63
End: 2023-06-01